# Patient Record
Sex: FEMALE | Race: WHITE | NOT HISPANIC OR LATINO | Employment: PART TIME | URBAN - METROPOLITAN AREA
[De-identification: names, ages, dates, MRNs, and addresses within clinical notes are randomized per-mention and may not be internally consistent; named-entity substitution may affect disease eponyms.]

---

## 2022-04-05 ENCOUNTER — OFFICE VISIT (OUTPATIENT)
Dept: NEUROLOGY | Facility: CLINIC | Age: 57
End: 2022-04-05

## 2022-04-05 VITALS
SYSTOLIC BLOOD PRESSURE: 136 MMHG | OXYGEN SATURATION: 98 % | HEART RATE: 67 BPM | WEIGHT: 143 LBS | BODY MASS INDEX: 27 KG/M2 | TEMPERATURE: 97.7 F | DIASTOLIC BLOOD PRESSURE: 82 MMHG | RESPIRATION RATE: 20 BRPM | HEIGHT: 61 IN

## 2022-04-05 DIAGNOSIS — G43.019 INTRACTABLE MIGRAINE WITHOUT AURA AND WITHOUT STATUS MIGRAINOSUS: Primary | ICD-10-CM

## 2022-04-05 DIAGNOSIS — G50.0 TRIGEMINAL NEURALGIA PAIN: ICD-10-CM

## 2022-04-05 PROCEDURE — 99204 OFFICE O/P NEW MOD 45 MIN: CPT | Performed by: PSYCHIATRY & NEUROLOGY

## 2022-04-05 RX ORDER — FREMANEZUMAB-VFRM 225 MG/1.5ML
225 INJECTION SUBCUTANEOUS
Qty: 1.5 ML | Refills: 11 | Status: SHIPPED | OUTPATIENT
Start: 2022-04-05 | End: 2023-04-05

## 2022-04-05 RX ORDER — SUMATRIPTAN 50 MG/1
50 TABLET, FILM COATED ORAL
COMMUNITY

## 2022-04-05 RX ORDER — CARBAMAZEPINE 100 MG/1
100 TABLET, EXTENDED RELEASE ORAL 2 TIMES DAILY
Qty: 60 TABLET | Refills: 2 | Status: SHIPPED | OUTPATIENT
Start: 2022-04-05

## 2022-04-05 RX ORDER — ACETAMINOPHEN, ASPIRIN AND CAFFEINE 250; 250; 65 MG/1; MG/1; MG/1
1 TABLET, FILM COATED ORAL EVERY 6 HOURS PRN
COMMUNITY

## 2022-04-05 NOTE — PROGRESS NOTES
Subjective:    CC: Maria Fernanda Singh is seen today in consultation at the request of RUBIO Jewell for [facility] (Atypical migraine)       HPI:  Patient is a 56-year-old female with past medical history of trigeminal neuralgia and migraines referred to clinic to establish care.  She reports that she has had migraines since age 22 and it has progressively become worse in intensity and frequency.  In last 6 months, she has had on and average 15-16 migraine days in a month.  She reports that migraine typically starts in the periorbital or retro-orbital region or sometimes involving the top of the head associated with light and sound sensitivity as well as nausea and vomiting.  She reports pain is 6-7 out of 10 and it is of throbbing or sometimes sharp shooting type of pain.  She reports her typical triggers are bright lights, weather changes, certain foods, and chemical smell.  In addition to this, she also reports trigeminal neuralgia pain on the left which has been ongoing for last 10 years.  Sometimes the pain is triggered by chewing.  She has several episodes of facial pain on the left throughout the day.  In the past, she has tried and failed amitriptyline, nortriptyline, Topamax and gabapentin for migraines.  Currently she uses Excedrin Migraine, ibuprofen or sumatriptan as needed as an abortive treatment.    The following portions of the patient's history were reviewed today and updated as of 04/05/2022  : allergies, social history and problem list.  This document will be scanned to patient's chart.      Current Outpatient Medications:   •  aspirin-acetaminophen-caffeine (EXCEDRIN MIGRAINE) 250-250-65 MG per tablet, Take 1 tablet by mouth Every 6 (Six) Hours As Needed for Headache., Disp: , Rfl:   •  Ibuprofen (IBU-200 PO), Take  by mouth., Disp: , Rfl:   •  promethazine (PHENERGAN) 12.5 MG half tablet, Take 6.25 mg by mouth Every 6 (Six) Hours As Needed for Nausea or Vomiting., Disp: , Rfl:  "  •  SUMAtriptan (IMITREX) 50 MG tablet, Take 50 mg by mouth Every 2 (Two) Hours As Needed for Migraine. Take one tablet at onset of headache. May repeat dose one time in 2 hours if headache not relieved., Disp: , Rfl:   •  carBAMazepine XR (TEGretol-XR) 100 MG 12 hr tablet, Take 1 tablet by mouth 2 (Two) Times a Day., Disp: 60 tablet, Rfl: 2  •  Fremanezumab-vfrm (Ajovy) 225 MG/1.5ML solution auto-injector, Inject 225 mg under the skin into the appropriate area as directed Every 30 (Thirty) Days., Disp: 1.5 mL, Rfl: 11   History reviewed. No pertinent past medical history.   Past Surgical History:   Procedure Laterality Date   • GALLBLADDER SURGERY     • HERNIA REPAIR     • HYSTEROSCOPY TUBAL STERILIZATION  1993   • THYROID BIOPSY  12/2021      Family History   Problem Relation Age of Onset   • Hypertension Mother    • Stroke Father    • Hypertension Father    • Diabetes Father    • Stroke Sister    • Migraines Sister    • Hyperlipidemia Sister    • Hypertension Sister    • Aneurysm Sister    • Breast cancer Sister    • Diabetes Sister    • Thyroid disease Sister    • Diabetes Maternal Grandmother    • Diabetes Paternal Grandmother       Review of Systems    All other systems reviewed and are negative     Objective:    /82 (BP Location: Left arm, Patient Position: Sitting, Cuff Size: Adult)   Pulse 67   Temp 97.7 °F (36.5 °C) (Infrared)   Resp 20   Ht 154 cm (60.63\")   Wt 64.9 kg (143 lb)   SpO2 98%   BMI 27.35 kg/m²     Neurology Exam:    General apperance: NAD.     Mental status: Alert, awake and oriented to time place and person.    Recent and Remote memory: Can recall 3/3 objects at 5 minutes. Can recall historical events.     Attention span and Concentration: Serial 7s: Normal.     Fund of knowledge:  Normal.     Language and Speech: No aphasia or dysarthria.    Naming , Repitition and Comprehension:  Can name objects, repeat a sentence and follow commands. Speech is clear and fluent with good " repetition, comprehension, and naming.    Cranial Nerves:   CN II: Visual fields are full. Intact. Fundi - Normal, No papillederma, Pupils - REKHA  CN III, IV and VI: Extraocular movements are intact. Normal saccades.   CN V: Facial sensation is intact.   CN VII: Muscles of facial expression reveal no asymmetry. Intact.   CN VIII: Hearing is intact. Whispered voice intact.   CN IX and X: Palate elevates symmetrically. Intact  CN XI: Shoulder shrug is intact.   CN XII: Tongue is midline without evidence of atrophy or fasciculation.     Motor:  Right UE muscle strength 5/5. Normal tone.     Left UE muscle strength 5/5. Normal tone.      Right LE muscle strength5/5. Normal tone.     Left LE muscle strength 5/5. Normal tone.      Sensory: Normal light touch, vibration and pinprick sensation bilaterally.    DTRs: 2+ bilaterally in upper and lower extremities.    Babinski: Negative bilaterally.    Co-ordination: Normal finger-to-nose, heel to shin B/L.    Rhomberg: Negative.    Gait: Normal.    Cardiovascular: Regular rate and rhythm without murmur, gallop or rub.    Ophthalmoscopic exam: Normal fundi, no papilledema.    Assessment and Plan:  1. Intractable migraine without aura and without status migrainosus  2. Trigeminal neuralgia pain  Patient with approximately 15-16 migraine days in a month.  She has had migraines for over 20 years.  She has tried and failed amitriptyline, nortriptyline, Topamax and gabapentin in the past without much success.  I am going to start her on Ajovy monthly injection for migraine prevention.  I will also start her on Tegretol 100 mg daily for 1 week then 100 mg twice daily for trigeminal neuralgia pain on the left.  Based on the response, further dose adjustment will be made.  Okay to continue with Excedrin Migraine alternating with sumatriptan/Phenergan combination for abortive treatment.  I will plan to see her back in 6 weeks for follow-up.       Return in about 6 weeks (around  5/17/2022).     Syed Reyes MD

## 2022-05-12 ENCOUNTER — PRIOR AUTHORIZATION (OUTPATIENT)
Dept: NEUROLOGY | Facility: CLINIC | Age: 57
End: 2022-05-12

## 2022-05-12 NOTE — TELEPHONE ENCOUNTER
PA has been started.     Maria Fernanda Singh (Key: [id number]) - [id number]  AJOVY (fremanezumab-vfrm) injection 225MG/1.5ML auto-injectors